# Patient Record
Sex: MALE | URBAN - METROPOLITAN AREA
[De-identification: names, ages, dates, MRNs, and addresses within clinical notes are randomized per-mention and may not be internally consistent; named-entity substitution may affect disease eponyms.]

---

## 2017-05-18 ENCOUNTER — IMPORTED ENCOUNTER (OUTPATIENT)
Dept: URBAN - METROPOLITAN AREA CLINIC 1 | Facility: CLINIC | Age: 72
End: 2017-05-18

## 2017-05-18 PROBLEM — H01.001: Noted: 2017-05-18

## 2017-05-18 PROBLEM — S04.042A: Noted: 2017-05-18

## 2017-05-18 PROBLEM — H01.004: Noted: 2017-05-18

## 2017-05-18 PROBLEM — S04.041A: Noted: 2017-05-18

## 2017-05-18 PROCEDURE — 92004 COMPRE OPH EXAM NEW PT 1/>: CPT

## 2017-05-18 NOTE — PATIENT DISCUSSION
1. Periorbital Contusion OU/ [address] OD due to Traumatic Fall- no intraocular involvement. 2. Pseudophakia OU (Dr. Rosalie Avina) 3. Blepharitis anterior type OU - D/c Tobrex leni; Begin Daily warm compresses and lid scrubs were recommended. Return for an appointment in 1 year 27 with Dr. Andree Martinez.

## 2019-04-10 NOTE — PATIENT DISCUSSION
CATARACTS, OU - VISUALLY SIGNIFICANT. PT TO CALL WHEN READY TO PROCEED WITH SURGERY. IF VISUAL SYMPTOMS PERSIST.

## 2022-04-02 ASSESSMENT — VISUAL ACUITY
OS_CC: 20/25
OD_CC: 20/25

## 2022-04-02 ASSESSMENT — TONOMETRY
OS_IOP_MMHG: 14
OD_IOP_MMHG: 14